# Patient Record
Sex: MALE | Race: WHITE | Employment: FULL TIME | URBAN - NONMETROPOLITAN AREA
[De-identification: names, ages, dates, MRNs, and addresses within clinical notes are randomized per-mention and may not be internally consistent; named-entity substitution may affect disease eponyms.]

---

## 2017-04-29 PROBLEM — T50.901A OVERDOSE: Status: ACTIVE | Noted: 2017-04-29

## 2017-04-29 PROBLEM — J96.00 ACUTE RESPIRATORY FAILURE (HCC): Status: ACTIVE | Noted: 2017-04-29

## 2017-05-01 PROBLEM — F19.90 SUBSTANCE USE DISORDER: Status: ACTIVE | Noted: 2017-04-29

## 2020-04-08 ENCOUNTER — APPOINTMENT (OUTPATIENT)
Dept: GENERAL RADIOLOGY | Age: 40
End: 2020-04-08

## 2020-04-08 ENCOUNTER — HOSPITAL ENCOUNTER (EMERGENCY)
Age: 40
Discharge: HOME | End: 2020-04-08

## 2020-04-08 VITALS
HEIGHT: 65 IN | HEART RATE: 80 BPM | OXYGEN SATURATION: 95 % | DIASTOLIC BLOOD PRESSURE: 82 MMHG | RESPIRATION RATE: 15 BRPM | WEIGHT: 172 LBS | BODY MASS INDEX: 28.66 KG/M2 | SYSTOLIC BLOOD PRESSURE: 107 MMHG | TEMPERATURE: 98.1 F

## 2020-04-08 PROCEDURE — 73080 X-RAY EXAM OF ELBOW: CPT

## 2020-04-08 PROCEDURE — 99282 EMERGENCY DEPT VISIT SF MDM: CPT

## 2020-04-08 PROCEDURE — 73030 X-RAY EXAM OF SHOULDER: CPT

## 2020-04-08 PROCEDURE — 73110 X-RAY EXAM OF WRIST: CPT

## 2020-04-08 PROCEDURE — 73060 X-RAY EXAM OF HUMERUS: CPT

## 2020-04-08 PROCEDURE — 73090 X-RAY EXAM OF FOREARM: CPT

## 2020-04-08 PROCEDURE — 6370000000 HC RX 637 (ALT 250 FOR IP): Performed by: PHYSICIAN ASSISTANT

## 2020-04-08 PROCEDURE — 73130 X-RAY EXAM OF HAND: CPT

## 2020-04-08 RX ORDER — TRAMADOL HYDROCHLORIDE 50 MG/1
50 TABLET ORAL ONCE
Status: COMPLETED | OUTPATIENT
Start: 2020-04-08 | End: 2020-04-08

## 2020-04-08 RX ADMIN — TRAMADOL HYDROCHLORIDE 50 MG: 50 TABLET, COATED ORAL at 23:00

## 2020-04-08 ASSESSMENT — PAIN SCALES - GENERAL
PAINLEVEL_OUTOF10: 10
PAINLEVEL_OUTOF10: 6

## 2020-04-08 ASSESSMENT — PAIN DESCRIPTION - DESCRIPTORS: DESCRIPTORS: TIGHTNESS;TENDER

## 2020-04-08 ASSESSMENT — PAIN DESCRIPTION - FREQUENCY: FREQUENCY: INTERMITTENT

## 2020-04-08 ASSESSMENT — ENCOUNTER SYMPTOMS
BACK PAIN: 0
VOMITING: 0
COLOR CHANGE: 0
ABDOMINAL PAIN: 0
NAUSEA: 0
SHORTNESS OF BREATH: 0

## 2020-04-08 ASSESSMENT — PAIN DESCRIPTION - LOCATION: LOCATION: ARM;WRIST;FINGER (COMMENT WHICH ONE)

## 2020-04-08 ASSESSMENT — PAIN DESCRIPTION - ORIENTATION: ORIENTATION: RIGHT

## 2020-04-09 NOTE — ED NOTES
Patient resting in chair. Gave ice and medication per protocol. Denies any needs at this time.      Stephen Chavez RN  04/08/20 [id number]

## 2020-04-09 NOTE — ED PROVIDER NOTES
[facility] EMERGENCY DEPT      CHIEF COMPLAINT       Chief Complaint   Patient presents with    Wrist Pain    Arm Pain       Nurses Notes reviewed and I agree except asnoted in the HPI. HISTORY OFPRESENT ILLNESS    Radha Soriano is a 42 y.o. male who presents to the emergency department for evaluation of diffuse right arm pain. The patient reports that approximately 2 hours prior to arrival to this department, he dropped 75 to 80 pounds of cardboard on his right hand. He reports that this caused his right wrist to bend backwards. The patient reports that his pain is most significant in the right hand and the right wrist.  He reports that this pain radiates from the fingertips of his right hand to his right wrist and up his forearm to his right shoulder and right pectoral muscle. Patient reports his current pain to be at a 10/10 in severity, and he reports worsening of his pain with any attempted movement of the right upper extremity. The patient reports tingling of the fingers of the right hand but denies any numbness. He reports that it hurts too badly for him to move his fingers. The patient denies any noted bruising or swelling. The patient reports that when this injury occurred, he heard a pop in his right hand and wrist.  The patient is right-hand dominant. The patient denies any additional injuries or areas of pain. There are no additional complaints at this time. REVIEW OF SYSTEMS      Review of Systems   Constitutional: Negative for fatigue and fever. Respiratory: Negative for shortness of breath. Cardiovascular: Negative for chest pain. Gastrointestinal: Negative for abdominal pain, nausea and vomiting. Musculoskeletal: Positive for arthralgias (right arm) and myalgias (right pectoral muscle). Negative for back pain, joint swelling and neck pain. Skin: Negative for color change, pallor and wound. Neurological: Negative for weakness and numbness.         Tingling of right fingers        PAST MEDICAL HISTORY    has a past medical history of Adult ADHD, Depression, Hypertension, Liver disease, Pneumonia, and Psychiatric problem. SURGICAL HISTORY      has a past surgical history that includes Ankle surgery and fracture surgery. CURRENT MEDICATIONS       Previous Medications    LIDOCAINE (LIDODERM) 5 %    Place 3 patches onto the skin daily 12 hours on, 12 hours off. MIRTAZAPINE (REMERON) 15 MG TABLET    Take 1 tablet by mouth nightly    NICOTINE (NICODERM CQ) 21 MG/24HR    Place 1 patch onto the skin daily    TRAZODONE (DESYREL) 50 MG TABLET    Take 1 tablet by mouth nightly as needed for Sleep       ALLERGIES     is allergic to penicillins. FAMILY HISTORY     He indicated that his mother is alive. He reported the following about his father: unkown. He indicated that the status of his brother is unknown. @[facility]@    SOCIAL HISTORY      reports that he has been smoking cigarettes. He started smoking about 23 years ago. He has a 7.50 pack-year smoking history. His smokeless tobacco use includes chew. He reports current alcohol use of about 2.0 - 3.0 standard drinks of alcohol per week. He reports current drug use. Frequency: 7.00 times per week. Drugs: Cocaine and Marijuana. PHYSICAL EXAM     INITIAL VITALS:  height is 5' 5\" (1.651 m) and weight is 172 lb (78 kg). His oral temperature is 98.1 °F (36.7 °C). His blood pressure is 107/82 and his pulse is 80. His respiration is 15 and oxygen saturation is 95%. Physical Exam  Vitals signs and nursing note reviewed. Constitutional:       General: He is not in acute distress. Appearance: Normal appearance. He is well-developed. He is not ill-appearing, toxic-appearing or diaphoretic. HENT:      Head: Normocephalic and atraumatic. Right Ear: External ear normal.      Left Ear: External ear normal.      Nose: Nose normal.      Mouth/Throat:      Mouth: Mucous membranes are moist.      Pharynx: Oropharynx is clear. Eyes:      General: No scleral icterus. Right eye: No discharge. Left eye: No discharge. Conjunctiva/sclera: Conjunctivae normal.      Pupils: Pupils are equal, round, and reactive to light. Neck:      Musculoskeletal: Normal range of motion. Cardiovascular:      Rate and Rhythm: Normal rate. Pulses: Normal pulses. Radial pulses are 2+ on the right side. Comments: Capillary refill is less than 3 seconds. Pulmonary:      Effort: Pulmonary effort is normal. No respiratory distress. Breath sounds: No wheezing. Abdominal:      General: There is no distension. Palpations: Abdomen is soft. Musculoskeletal:         General: Tenderness present. No swelling or deformity. Right shoulder: He exhibits decreased range of motion, tenderness, pain and decreased strength. He exhibits no swelling, no effusion, no crepitus, no deformity and no spasm. Right elbow: He exhibits normal range of motion, no swelling, no effusion and no deformity. Tenderness found. Right wrist: He exhibits decreased range of motion and tenderness. He exhibits no swelling, no effusion, no crepitus and no deformity. Cervical back: Normal. He exhibits normal range of motion, no tenderness, no swelling, no deformity, no pain and no spasm. Right upper arm: He exhibits no tenderness, no swelling and no deformity. Right forearm: He exhibits tenderness. He exhibits no swelling and no deformity. Right hand: He exhibits decreased range of motion and tenderness. He exhibits normal capillary refill, no deformity and no swelling. Normal sensation noted. Decreased strength noted. Comments: There is diffuse tenderness of the palmar aspect of the right hand as well as diffuse tenderness of the right wrist, including right scaphoid tenderness. There is mild tenderness of the posterior aspect of the right elbow as well as the ventral aspect of the right forearm.  There is been created using voice recognition software. It may contain minor errors which are inherent in voice recognition technology. **      Final report electronically signed by Dr. Jacqueline Lennon on 4/8/2020 11:02 PM          LABS:   Labs Reviewed - No data to display    EMERGENCY DEPARTMENT COURSE:   Vitals:    Vitals:    04/08/20 2148 04/08/20 2301   BP: (!) 144/72 107/82   Pulse: 71 80   Resp: 16 15   Temp: 98.1 °F (36.7 °C)    TempSrc: Oral    SpO2: 98% 95%   Weight: 172 lb (78 kg)    Height: 5' 5\" (1.651 m)      The patient was seen and evaluated within the ED today with right arm pain worse in the right wrist and hand following a work-related injury. Within the department, I observed the patient's vital signs to be within acceptable range. On exam, I appreciated diffuse tenderness of the palmar aspect of the right hand as well as diffuse tenderness of the right wrist, including right scaphoid tenderness. There is mild tenderness of the posterior aspect of the right elbow as well as the ventral aspect of the right forearm. There is tenderness of the anterior aspect of the right shoulder, worse at the proximal biceps tendon. There is no associated edema or bruising of the right upper extremity. The patient has reduced range of motion of the right shoulder, right wrist, and right fingers secondary to pain and due to limited effort on exam, as the patient refused range of motion testing of the RUE. The patient has good range of motion of the right elbow. There is intact sensation of soft touch in the RUE. The RUE appears to be neurovascularly intact. There is no midline spinal or paraspinal tenderness of the cervical spine. There is no anterior chest wall tenderness. Radiologic studies within the department revealed no acute fracture, dislocation, or soft tissue abnormality of the right shoulder, right humerus, right elbow, right forearm, right wrist, or right hand.  Within the department, the patient was

## 2020-04-09 NOTE — ED TRIAGE NOTES
Patient reports to the ER for wrist and arm pain. He was at work around [employer] his arm got pinned between a heavy board and the steering wheel of the skid . He states the pain radiates from his fingertips up to his armpit on his right side.